# Patient Record
Sex: MALE | Race: WHITE | NOT HISPANIC OR LATINO | Employment: FULL TIME | URBAN - METROPOLITAN AREA
[De-identification: names, ages, dates, MRNs, and addresses within clinical notes are randomized per-mention and may not be internally consistent; named-entity substitution may affect disease eponyms.]

---

## 2023-02-25 ENCOUNTER — ANCILLARY PROCEDURE (OUTPATIENT)
Dept: RADIOLOGY | Facility: CLINIC | Age: 43
End: 2023-02-25
Payer: COMMERCIAL

## 2023-03-02 DIAGNOSIS — S06.5XAA SDH (SUBDURAL HEMATOMA) (H): Primary | ICD-10-CM

## 2023-03-03 ENCOUNTER — MEDICAL CORRESPONDENCE (OUTPATIENT)
Dept: NEUROSURGERY | Facility: CLINIC | Age: 43
End: 2023-03-03
Payer: COMMERCIAL

## 2023-03-06 ENCOUNTER — ANCILLARY PROCEDURE (OUTPATIENT)
Dept: RADIOLOGY | Facility: CLINIC | Age: 43
End: 2023-03-06
Payer: COMMERCIAL

## 2023-03-09 ENCOUNTER — MEDICAL CORRESPONDENCE (OUTPATIENT)
Dept: NEUROSURGERY | Facility: CLINIC | Age: 43
End: 2023-03-09
Payer: COMMERCIAL

## 2023-03-13 ENCOUNTER — OFFICE VISIT (OUTPATIENT)
Dept: NEUROSURGERY | Facility: CLINIC | Age: 43
End: 2023-03-13
Payer: OTHER MISCELLANEOUS

## 2023-03-13 VITALS
OXYGEN SATURATION: 99 % | DIASTOLIC BLOOD PRESSURE: 78 MMHG | WEIGHT: 179 LBS | BODY MASS INDEX: 29.79 KG/M2 | HEART RATE: 77 BPM | SYSTOLIC BLOOD PRESSURE: 133 MMHG

## 2023-03-13 DIAGNOSIS — S06.2X0A: ICD-10-CM

## 2023-03-13 DIAGNOSIS — S06.5XAA SDH (SUBDURAL HEMATOMA) (H): Primary | ICD-10-CM

## 2023-03-13 PROCEDURE — 99204 OFFICE O/P NEW MOD 45 MIN: CPT | Performed by: NURSE PRACTITIONER

## 2023-03-13 RX ORDER — OMEGA-3 FATTY ACIDS/FISH OIL 300-1000MG
200 CAPSULE ORAL EVERY 4 HOURS PRN
COMMUNITY

## 2023-03-13 ASSESSMENT — PAIN SCALES - GENERAL: PAINLEVEL: MODERATE PAIN (4)

## 2023-03-13 NOTE — PROGRESS NOTES
NEUROSURGERY CONSULTATION NOTE  3/13/2023     CHIEF COMPLAINT: head trauma     HPI:    Ermias Roe is a 42 year old male who is sent to us in consultation by his employer after a fall 2/24/2023 falling 5 feet from a crane due to ice landing on his side and his head. No loss of consciousness. Initially evalulated at [facility]. Patient lives in [place]. No new images today. Last image 3/7/2023 head CT showed thin subdural along the left convexity, contusion noted as well along the left frontal middle gyrus. This image is not compared to [place] by radiology. [place] CT 2/25 acute 4 mm subdural left frontal without midline shift. Small intraparenchymal hemorrhage left frontal lobe, left temporal intraparenchymal or subarachnoid hemorrhage. Nondisplaced left occipital fracture. Images personally reviewed. Ermias reports daily constant headaches that get better with ibuprofen, worse during the night, waking him. Not taking anything right before bed. Suggest he try to take something before bed to he does not wake from sleep in pain. Ibuprofen is not preferred but he seems to tolerate it, shown with stable CT scan. Ermias States his vision is difficult to focus initially when he looks at something but then clears up. Described as blurry vision. No nausea. No photophobia. He is not driving. No issues with TV, cell phones, reading. He was given steroids for 4 days and Keppra for 7 days from [facility]. He continues to be fairly symptomatic from head trauma. He would likely benefit from concussion clinic. He has work comp representative here today who will arrange at [facility] as he has had prior clients benefit from their services. Recommend Ermias continue to stay out of work until he has more time to recover from injury. He does state he is feeling better. Better than the first week - which he describes as the worst week of his life. We will see him back in 3 weeks with new CT. If stable and symptoms improving he  could probably return to work pending description of work duties. We discussed red flag warning's to watch for and to avoid strenuous activity until SDH resolves and his symptoms improve.     PLAN:   3 weeks with JB and head CT - prefer at [facility] so they can compare to prior   Stay out of work until we visit again  Continue occ health as already set up by your employer  [facility] - work comp rep stated he would set up   Avoid strenuous activity  Limit weight lifting amount to 5-10 pounds      Past Medical History:   Diagnosis Date     Tobacco dependency     chew     Past Surgical History:   Procedure Laterality Date     HC REPAIR EXTEN TENDON FINGER W/O GRAFT, EACH  09/30/08    L first finger     RW SKELETAL/MUSCUL (ABSTRACTED)  2006    right ankle       REVIEW OF SYSTEMS:  Negative with exception of HPI     MEDICATIONS:  Current Outpatient Medications   Medication Sig Dispense Refill     NO ACTIVE MEDICATIONS . 0 0         ALLERGIES/SENSITIVITIES:     Allergies   Allergen Reactions     No Known Drug Allergy        PERTINENT SOCIAL HISTORY:   Social History     Socioeconomic History     Marital status: Single   Tobacco Use     Smoking status: Never     Smokeless tobacco: Current   Other Topics Concern      Service No     Blood Transfusions No     Caffeine Concern Yes     Comment: 1 can daily     Exercise No     Seat Belt Yes         FAMILY HISTORY:  Family History   Problem Relation Age of Onset     Asthma No family hx of      Diabetes No family hx of      Hypertension No family hx of      Cerebrovascular Disease No family hx of      Prostate Cancer Father      Depression Brother      Heart Disease No family hx of      Lipids No family hx of      Psychotic Disorder No family hx of      Thyroid Disease No family hx of         PHYSICAL EXAM:     Constitution: /78   Pulse 77   Wt 81.2 kg (179 lb)   SpO2 99%   BMI 29.79 kg/m      General: alert, oriented. MARTINI. Speech clear.       Cranial Nerves: PERRL, EOMI, face symmetric, tongue midline, shoulder shrug equal No pronator drift, finger to nose smooth and accurate bilaterally     Motor: Normal bulk and tone all muscle groups of upper and lower extremities.     Strength   Appears to have normal strength     Sensory: Sensation intact bilaterally to light touch and temperature throughout.     Coordination:  Gait is WNL.     IMAGING: I personally reviewed all radiographic images    KAT Leon [facility]        Cc:   No primary care provider on file.

## 2023-03-13 NOTE — NURSING NOTE
"Ermias Roe is a 42 year old male who presents for:  Chief Complaint   Patient presents with     Follow Up     Concussion check up  CT         Initial Vitals:  There were no vitals taken for this visit. Estimated body mass index is 29.79 kg/m  as calculated from the following:    Height as of 12/12/13: 5' 5\" (1.651 m).    Weight as of 12/12/13: 179 lb (81.2 kg).. There is no height or weight on file to calculate BSA. BP completed using cuff size: regular  Moderate Pain (4)        Rohit Singer    "

## 2023-03-13 NOTE — PATIENT INSTRUCTIONS
3 weeks head CT  Sooner if you develop worsening symptoms     Concussion clinic - [facility] - your work comp will arrange     Seek medical attention with the following symptoms:  *Worsening headache   *Seizures  *Persistent nausea and vomiting  *Increased sleepiness or difficulty being awakened  *Change in ability to walk, see, think, or talk  *Worsening or new onset of weakness, or numbness and tingling  *Loss or change in your ability to control bowel or bladder function    Stay out of work until we visit again

## 2023-04-04 ENCOUNTER — PRE VISIT (OUTPATIENT)
Dept: NEUROSURGERY | Facility: CLINIC | Age: 43
End: 2023-04-04
Payer: COMMERCIAL

## 2023-04-04 ENCOUNTER — ANCILLARY PROCEDURE (OUTPATIENT)
Dept: RADIOLOGY | Facility: CLINIC | Age: 43
End: 2023-04-04
Payer: COMMERCIAL

## 2023-04-04 NOTE — TELEPHONE ENCOUNTER
NEUROSURGERY- NEW PREVISIT PLANNING       Record Status/Location     Referring Provider Epic Return   Diagnosis In 3/13/23 progress note SDH; contusion of brain w/o loss of consciousness, initial encounter   MRI (HEAD, NECK, SPINE)  NO   CT Epic Head 3/6/23  Head 2/25/23   X-ray  NO   INJECTION  NO   PHYSICAL THERAPY  NO   SURGERY  NO

## 2023-04-06 ENCOUNTER — OFFICE VISIT (OUTPATIENT)
Dept: NEUROSURGERY | Facility: CLINIC | Age: 43
End: 2023-04-06
Payer: OTHER MISCELLANEOUS

## 2023-04-06 VITALS
BODY MASS INDEX: 29.82 KG/M2 | SYSTOLIC BLOOD PRESSURE: 119 MMHG | DIASTOLIC BLOOD PRESSURE: 76 MMHG | OXYGEN SATURATION: 98 % | HEART RATE: 67 BPM | WEIGHT: 179 LBS | HEIGHT: 65 IN

## 2023-04-06 DIAGNOSIS — S06.5XAA SDH (SUBDURAL HEMATOMA) (H): Primary | ICD-10-CM

## 2023-04-06 PROCEDURE — 99213 OFFICE O/P EST LOW 20 MIN: CPT | Performed by: NURSE PRACTITIONER

## 2023-04-06 NOTE — PROGRESS NOTES
"NEUROSURGERY PROGRESS NOTE:  4/6/2023       A/P:  Ermias is a 43yo patient here for a follow up visit today for a left cerebral convexity subdural and parenchymal contusion     Ermias is feeling totally fine today. He has no headaches, dizziness, n/v, numbness/tingling, or difficulty concentrating. He has been avoiding anticoagulation and minding activity instructions. His head CT shows left sided subdural is very small, appears chronic. No mass effect. Contusion evolving, no acute blood. He never had any seizures. His exam is normal today. He may return to work without restriction. I do not feel there is benefit to repeating any additional CT scans based on small size of sdh and clinically doing well.     Plan:  1. OK to return to work without restriction  2. OK to resume normal activity  3. OK for anticoagulation  4. Balance appt no longer necessary, patient can cancel this  5. Call if any recurrent symptoms including headache, dizziness, n/v, tingling/numbness, weakness, or confusion and we would offer to repeat a head CT otherwise no additional follow up needed at this time.    HPI:    \"Ermias Roe is a 42 year old male who is sent to us in consultation by his employer after a fall 2/24/2023 falling 5 feet from a crane due to ice landing on his side and his head. No loss of consciousness. Initially evalulated at [place]. Patient lives in [place]. No new images today. Last image 3/7/2023 head CT showed thin subdural along the left convexity, contusion noted as well along the left frontal middle gyrus. This image is not compared to [place] by radiology. [place] CT 2/25 acute 4 mm subdural left frontal without midline shift. Small intraparenchymal hemorrhage left frontal lobe, left temporal intraparenchymal or subarachnoid hemorrhage. Nondisplaced left occipital fracture. Images personally reviewed. Ermias reports daily constant headaches that get better with ibuprofen, worse during the night, waking him. Not taking " "anything right before bed. Suggest he try to take something before bed to he does not wake from sleep in pain. Ibuprofen is not preferred but he seems to tolerate it, shown with stable CT scan. Ermias States his vision is difficult to focus initially when he looks at something but then clears up. Described as blurry vision. No nausea. No photophobia. He is not driving. No issues with TV, cell phones, reading. He was given steroids for 4 days and Keppra for 7 days from [facility]. He continues to be fairly symptomatic from head trauma. He would likely benefit from concussion clinic. He has work comp representative here today who will arrange at the [facility] as he has had prior clients benefit from their services. Recommend Ermias continue to stay out of work until he has more time to recover from injury. He does state he is feeling better. Better than the first week - which he describes as the worst week of his life. We will see him back in 3 weeks with new CT. If stable and symptoms improving he could probably return to work pending description of work duties. We discussed red flag warning's to watch for and to avoid strenuous activity until SDH resolves and his symptoms improve. \"          PHYSICAL EXAM:     Constitution: /76   Pulse 67   Ht 5' 5\" (1.651 m)   Wt 179 lb (81.2 kg)   SpO2 98%   BMI 29.79 kg/m      General: alert, oriented. MARTINI. Speech clear.      Cranial Nerves: PERRL, EOMI, face symmetrical, tongue midline, shoulder shrug equal     No pronator drift, finger to nose smooth and accurate bilaterally     Motor: Normal bulk and tone all muscle groups of upper and lower extremities.     Strength   Normal strength throughout both upper and lower extremities, all planes    Sensory: Sensation intact bilaterally to light touch throughout all 4 extremities     Coordination:  Gait is WNL.         IMAGING:   I personally reviewed all radiographic images  Head CT from 4/4  Tiny residual " chronic left sided subdural only clear on specific sequences. Evolving chronic appearing left frontal parenchymal contusion     Report available in media-      Natalia Thomas P-C  [facility]surgery  O. [phone]

## 2023-04-06 NOTE — PATIENT INSTRUCTIONS
Ermias is feeling totally fine.  No headaches, dizziness, n/v, numbness/tingling, or difficulty concentrating. He has been avoiding anticoagulation and minding activity instructions. His head CT shows left sided subdural is very small, appears chronic. No mass effect. Contusion evolving, no acute blood. He never had any seizures. His exam is normal today. He may return to work without restriction. I do not feel there is benefit to repeating any additional CT scans based on small size of sdh and clinically doing well.     Plan:  OK to return to work without restriction  OK to resume normal activity  OK for anticoagulation  Balance appt no longer necessary, patient can cancel this  Call if any recurrent symptoms including headache, dizziness, n/v, tingling/numbness, weakness, or confusion and we would offer to repeat a head CT otherwise no additional follow up needed at this time.    Natalia MCKINNEYP-C  [facility]surgery  O. [phone]

## 2023-04-06 NOTE — LETTER
April 6, 2023      Ermias Roe  [address] [place] [id number]        To Whom It May Concern:    Ermias Roe was seen in our clinic. He may return to work on 4/10/23 without restrictions. Please contact our clinic with any questions or concerns.      Sincerely,      Natalia ARGUETA  [facility]  O. [phone]

## 2023-05-01 ENCOUNTER — MEDICAL CORRESPONDENCE (OUTPATIENT)
Dept: NEUROSURGERY | Facility: CLINIC | Age: 43
End: 2023-05-01
Payer: COMMERCIAL